# Patient Record
Sex: MALE | Race: WHITE | NOT HISPANIC OR LATINO | Employment: STUDENT | URBAN - METROPOLITAN AREA
[De-identification: names, ages, dates, MRNs, and addresses within clinical notes are randomized per-mention and may not be internally consistent; named-entity substitution may affect disease eponyms.]

---
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
## 2025-01-17 DIAGNOSIS — F41.1 GAD (GENERALIZED ANXIETY DISORDER): ICD-10-CM

## 2025-01-17 RX ORDER — CARIPRAZINE 4.5 MG/1
4.5 CAPSULE, GELATIN COATED ORAL DAILY
Qty: 30 CAPSULE | Refills: 0 | OUTPATIENT
Start: 2025-01-17

## 2025-01-17 NOTE — TELEPHONE ENCOUNTER
Reason for call:   [x] Refill   [] Prior Auth  [] Other:     Office:   [] PCP/Provider -   [x] Specialty/Provider - [facility]     Medication:     cariprazine (Vraylar) 4.5 MG : TAKE 1 CAPSULE (4.5 MG TOTAL) BY MOUTH DAILY,          Pharmacy: [facility]     Does the patient have enough for 3 days?   [] Yes   [x] No - Send as HP to POD

## 2025-01-20 RX ORDER — CARIPRAZINE 4.5 MG/1
4.5 CAPSULE, GELATIN COATED ORAL DAILY
Qty: 30 CAPSULE | Refills: 0 | Status: SHIPPED | OUTPATIENT
Start: 2025-01-20

## 2025-01-25 DIAGNOSIS — F33.1 MAJOR DEPRESSIVE DISORDER, RECURRENT, MODERATE (HCC): ICD-10-CM

## 2025-01-25 DIAGNOSIS — F41.1 GAD (GENERALIZED ANXIETY DISORDER): ICD-10-CM

## 2025-01-27 RX ORDER — ESCITALOPRAM OXALATE 20 MG/1
30 TABLET ORAL DAILY
Qty: 45 TABLET | Refills: 0 | Status: SHIPPED | OUTPATIENT
Start: 2025-01-27 | End: 2025-02-05 | Stop reason: SDUPTHER

## 2025-02-05 ENCOUNTER — OFFICE VISIT (OUTPATIENT)
Dept: PSYCHIATRY | Facility: CLINIC | Age: 20
End: 2025-02-05
Payer: COMMERCIAL

## 2025-02-05 VITALS
HEIGHT: 69 IN | SYSTOLIC BLOOD PRESSURE: 134 MMHG | HEART RATE: 75 BPM | WEIGHT: 216 LBS | DIASTOLIC BLOOD PRESSURE: 71 MMHG | BODY MASS INDEX: 31.99 KG/M2

## 2025-02-05 DIAGNOSIS — F41.1 GAD (GENERALIZED ANXIETY DISORDER): ICD-10-CM

## 2025-02-05 DIAGNOSIS — F33.1 MAJOR DEPRESSIVE DISORDER, RECURRENT, MODERATE (HCC): ICD-10-CM

## 2025-02-05 PROCEDURE — 90833 PSYTX W PT W E/M 30 MIN: CPT | Performed by: NURSE PRACTITIONER

## 2025-02-05 PROCEDURE — 99214 OFFICE O/P EST MOD 30 MIN: CPT | Performed by: NURSE PRACTITIONER

## 2025-02-05 RX ORDER — ESCITALOPRAM OXALATE 20 MG/1
30 TABLET ORAL DAILY
Qty: 45 TABLET | Refills: 3 | Status: SHIPPED | OUTPATIENT
Start: 2025-02-05

## 2025-02-16 NOTE — ASSESSMENT & PLAN NOTE
Lexapro 1.5 tablets 30 mg total by mouth daily, Vraylar 4.5 mg daily      Jose Daniel reports depression has improved.  Denies suicidal ideation.  He is social and has a girlfriend and reports the relationship is good.  Reports appetite and sleep patterns are within normal limits.  Aims negative.  Medication is effective in controlling depression and anxiety.  Takes his medication as prescribed and family are supportive.    Orders:    escitalopram (LEXAPRO) 20 mg tablet; Take 1.5 tablets (30 mg total) by mouth daily

## 2025-02-16 NOTE — PSYCH
Visit Time    Visit Start Time: 5:00  Visit Stop Time: 530  Total Visit Duration:  30 minutes minutes    Subjective:     Patient ID: Jose Daniel Anthony is a 19 y.o. male.  History of anxiety and depression seen for medication management and mood assessment  Assessment & Plan  CHIQUITA (generalized anxiety disorder)  He reports moods are stable, medication are effective and he is doing well in school.  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  Denies anxiety or panic attacks.  Occasionally will have situational anxiety due to work for school.  He is coping and discussed coping skills.    Orders:    cariprazine (Vraylar) 4.5 MG capsule; Take 1 capsule (4.5 mg total) by mouth daily    escitalopram (LEXAPRO) 20 mg tablet; Take 1.5 tablets (30 mg total) by mouth daily    Major depressive disorder, recurrent, moderate (HCC)  Lexapro 1.5 tablets 30 mg total by mouth daily, Vraylar 4.5 mg daily      Jose Daniel reports depression has improved.  Denies suicidal ideation.  He is social and has a girlfriend and reports the relationship is good.  Reports appetite and sleep patterns are within normal limits.  Aims negative.  Medication is effective in controlling depression and anxiety.  Takes his medication as prescribed and family are supportive.    Orders:    escitalopram (LEXAPRO) 20 mg tablet; Take 1.5 tablets (30 mg total) by mouth daily       HPI ROS Appetite Changes and Sleep: normal appetite and normal energy level    Review Of Systems:     Mood Anxiety and Depression minimal   Behavior Normal    Thought Content Normal   General Emotional Problems   Personality Normal   Other Psych Symptoms Normal   Constitutional As Noted in HPI   ENT As Noted in HPI   Cardiovascular As Noted in HPI   Respiratory As Noted in HPI   Gastrointestinal As Noted in HPI   Genitourinary As Noted in HPI   Musculoskeletal As Noted in HPI   Integumentary As Noted in HPI   Neurological As Noted in HPI   Endocrine Normal    Other Symptoms Normal         Laboratory Results:     Substance Abuse History:  Social History     Substance and Sexual Activity   Drug Use Not Currently    Types: Marijuana    Comment: rare for anxiety       Family Psychiatric History:   Family History   Problem Relation Age of Onset    No Known Problems Mother     Bipolar disorder Father        The following portions of the patient's history were reviewed and updated as appropriate: allergies, current medications, past family history, past medical history, past social history, past surgical history, and problem list.    Social History     Socioeconomic History    Marital status: Single     Spouse name: Not on file    Number of children: Not on file    Years of education: Not on file    Highest education level: Not on file   Occupational History    Not on file   Tobacco Use    Smoking status: Some Days     Types: E-Cigarettes    Smokeless tobacco: Never   Vaping Use    Vaping status: Never Used   Substance and Sexual Activity    Alcohol use: Never    Drug use: Not Currently     Types: Marijuana     Comment: rare for anxiety    Sexual activity: Not Currently     Partners: Female   Other Topics Concern    Not on file   Social History Narrative    Not on file     Social Drivers of Health     Financial Resource Strain: Low Risk  (4/19/2021)    Overall Financial Resource Strain (CARDIA)     Difficulty of Paying Living Expenses: Not hard at all   Food Insecurity: No Food Insecurity (4/19/2021)    Hunger Vital Sign     Worried About Running Out of Food in the Last Year: Never true     Ran Out of Food in the Last Year: Never true   Transportation Needs: No Transportation Needs (4/19/2021)    PRAPARE - Transportation     Lack of Transportation (Medical): No     Lack of Transportation (Non-Medical): No   Physical Activity: Sufficiently Active (4/19/2021)    Exercise Vital Sign     Days of Exercise per Week: 5 days     Minutes of Exercise per Session: 120 min   Stress: Stress Concern Present  (4/19/2021)    [employer] [place] [employer] - Occupational Stress Questionnaire     Feeling of Stress : To some extent   Social Connections: Unknown (4/19/2021)    Social Connection and Isolation Panel [NHANES]     Frequency of Communication with Friends and Family: Twice a week     Frequency of Social Gatherings with Friends and Family: Twice a week     Attends [employer] Services: Not on file     Active Member of Clubs or Organizations: Not on file     Attends Club or Organization Meetings: Not on file     Marital Status: Never    Intimate Partner Violence: Not At Risk (4/19/2021)    Humiliation, Afraid, Rape, and Kick questionnaire     Fear of Current or Ex-Partner: No     Emotionally Abused: No     Physically Abused: No     Sexually Abused: No   Housing Stability: Not on file     Social History     Social History Narrative    Not on file       Objective:     Mental status:  Appearance calm and cooperative , adequate hygiene and grooming, and good eye contact    Mood mood appropriate   Affect affect appropriate    Speech a normal rate   Thought Processes normal thought processes   Hallucinations no hallucinations present    Thought Content no delusions   Abnormal Thoughts no suicidal thoughts  and no homicidal thoughts    Orientation  oriented to person and place and time   Remote Memory short term memory intact and long term memory intact   Attention Span concentration intact   Intellect Appears to be of Average Intelligence   Insight Insight intact   Judgement judgment was intact   Muscle Strength Muscle strength and tone were normal   Language no difficulty naming common objects   Fund of Knowledge displays adequate knowledge of current events   Pain none   Pain Scale 0       Assessment/Plan:       Diagnoses and all orders for this visit:    CHIQUITA (generalized anxiety disorder)  -     cariprazine (Vraylar) 4.5 MG capsule; Take 1 capsule (4.5 mg total) by mouth daily  -     escitalopram  "(LEXAPRO) 20 mg tablet; Take 1.5 tablets (30 mg total) by mouth daily    Major depressive disorder, recurrent, moderate (HCC)  -     escitalopram (LEXAPRO) 20 mg tablet; Take 1.5 tablets (30 mg total) by mouth daily          Treatment Recommendations- Risks Benefits      Immediate Medical/Psychiatric/Psychotherapy Treatments and Any Precautions: Continue treatment plan    Risks, Benefits And Possible Side Effects Of Medications:  {PSYCH RISK, BENEFITS AND POSSIBLE SIDE EFFECTS (Optional):63650    Psychotherapy Provided: 30 minutes individual psychotherapy provided.   Supportive therapy  Medication evaluation  Mood assessment  Aims negative  Normalized and validated his feelings  Safety plan updated   Depression Follow-up Plan Completed: Yes  See safety plan  Goals discussed in session: Maintain stable mood    \"Portions of the record may have been created with voice recognition software. Occasional wrong word or \"sound a like\" substitutions may have occurred due to the inherent limitations of voice recognition software. Read the chart carefully and recognize, using context, where substitutions have occurred. Please call if you have any questions. \"                                       "

## 2025-02-16 NOTE — ASSESSMENT & PLAN NOTE
He reports moods are stable, medication are effective and he is doing well in school.  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  Denies anxiety or panic attacks.  Occasionally will have situational anxiety due to work for school.  He is coping and discussed coping skills.    Orders:    cariprazine (Vraylar) 4.5 MG capsule; Take 1 capsule (4.5 mg total) by mouth daily    escitalopram (LEXAPRO) 20 mg tablet; Take 1.5 tablets (30 mg total) by mouth daily

## 2025-02-16 NOTE — BH CRISIS PLAN
Client Name: Jose Daniel Anthony       Client YOB: 2005    [facility] Safety Plan      Creation Date: 2/6/24 Update Date: 2/5/25   Created By: Noemy Boothe, PhD Last Updated By: Noemy Boothe, PhD      Step 1: Warning Signs:   Warning Signs   flustered            Step 2: Internal Coping Strategies:   Internal Coping Strategies   gymn something physical            Step 3: People and social settings that provide distraction:   Name Contact Information   none     Places   gymn           Step 4: People whom I can ask for help during a crisis:      Name Contact Information    mother, father cell      Step 5: Professionals or agencies I can contact during a crisis:      Clinican/Agency Name Phone Emergency Contact    [phone] Dr. Boothe      Local Emergency Department Emergency Department Phone Emergency Department Address    [address]          Crisis Phone Numbers:   Suicide Prevention Lifeline: Call or Text  [address] Crisis Text Line: Text HOME to 940-800   Please note: Some [facility] do not have a separate number for Child/Adolescent specific crisis. If your county is not listed under Child/Adolescent, please call the adult number for your county      Adult Crisis Numbers: Child/Adolescent Crisis Numbers   [facility]: [phone] [facility]: [phone]   [facility]: [phone] [facility]: [phone]   [facility]: [phone] [place]: [phone]   [facility]: [phone] [facility]: [phone]   [facility]: [phone]   [facility]: [phone]   [facility]: [phone]   [facility]: [phone] (daytime) [phone] (after hours, weekends, holidays)      Step 6: Making the environment safer (plan for lethal means safety):   Patient did not identify any lethal methods: Yes     Optional: What is most important to me and worth living for?   family     [facility] Safety Plan. Sarika Hurtado and  Genaro Ruiz. Used with permission of the authors.

## 2025-04-02 ENCOUNTER — TELEPHONE (OUTPATIENT)
Age: 20
End: 2025-04-02

## 2025-04-03 ENCOUNTER — OFFICE VISIT (OUTPATIENT)
Dept: PSYCHIATRY | Facility: CLINIC | Age: 20
End: 2025-04-03
Payer: COMMERCIAL

## 2025-04-03 VITALS
HEIGHT: 69 IN | WEIGHT: 216.8 LBS | BODY MASS INDEX: 32.11 KG/M2 | DIASTOLIC BLOOD PRESSURE: 70 MMHG | SYSTOLIC BLOOD PRESSURE: 133 MMHG | HEART RATE: 71 BPM

## 2025-04-03 DIAGNOSIS — F33.1 MAJOR DEPRESSIVE DISORDER, RECURRENT, MODERATE (HCC): ICD-10-CM

## 2025-04-03 DIAGNOSIS — F43.10 PTSD (POST-TRAUMATIC STRESS DISORDER): Primary | ICD-10-CM

## 2025-04-03 DIAGNOSIS — Z79.899 LONG-TERM USE OF HIGH-RISK MEDICATION: ICD-10-CM

## 2025-04-03 DIAGNOSIS — F41.1 GAD (GENERALIZED ANXIETY DISORDER): ICD-10-CM

## 2025-04-03 PROCEDURE — 99214 OFFICE O/P EST MOD 30 MIN: CPT | Performed by: NURSE PRACTITIONER

## 2025-04-03 PROCEDURE — 90833 PSYTX W PT W E/M 30 MIN: CPT | Performed by: NURSE PRACTITIONER

## 2025-04-03 NOTE — ASSESSMENT & PLAN NOTE
Reports PTSD symptoms are less, father stopped drinking 3 years ago and he reports participating in activities with him such as fishing.  He does occasionally recall past traumas and reports coping improved.  Lexapro 20 mg 1.5 tablets daily to equal 30 mg is effective for depression and anxiety.  He denies sexual side effects, appetite is good and he is sleeping PHQ-9 score of 7 indicates mild depression without suicidal ideations.  He has a girlfriend who is supportive

## 2025-04-03 NOTE — ASSESSMENT & PLAN NOTE
CHIQUITA-7 score of 9 indicates mild anxiety.  Situational anxiety is expressed as school tasks or job duties.  He reports coping well.  Denies side effects of medication.  Aims negative

## 2025-04-03 NOTE — ASSESSMENT & PLAN NOTE
Mild depression is reported usually due to situations and stress.  Support was provided discussed improvement throughout the years.  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  Girlfriend is supportive

## 2025-04-03 NOTE — BH TREATMENT PLAN
[facility]     Name and Date of Birth:  Jose Daniel Anthony 19 y.o. 2005  Date of Treatment Plan: April 15, 2021, November 4, 2021, April 19, 2022, November 15, 2022, May 1, 2023, November 2, 2023 April 29, 2024 October 31, 2024, April 3, 2025  Diagnosis/Diagnoses:    1. CHIQUITA (generalized anxiety disorder)    2. PTSD (post-traumatic stress disorder)    3. Major depressive disorder, recurrent, moderate (HCC)       Strengths/Personal Resources for Self-Care: supportive family, taking medications as prescribed, ability to communicate needs, average or above intelligence.  Area/Areas of need (in own words): anxiety, depression  1.         Long Term Goal: decrease anxiety and depression  Target Date:6 months -10/3/2025  Person/Persons responsible for completion of goal: Jose Daniel, provider, therapist, family  2.         Short Term Objective (s) - How will we reach this goal?:   A. Provider new recommended medication/dosage changes and/or continue medication(s): continue current medications as prescribed.  B. therapy.  C. structure, adequate sleep.  Target Date:6 months -10/3/2025     Person/Persons Responsible for Completion of Goal: Jose Daniel, provider, therapist, family  Progress Towards Goals: initiating treatment  Treatment Modality: medication management every 1-3 months  Review due 180 days from date of this plan: 6 months -10/3/2025  Expected length of service: maintenance  My Physician/PA/NP and I have developed this plan together and I agree to work on the goals and objectives. I understand the treatment goals that were developed for my treat
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
## 2025-04-09 NOTE — BH CRISIS PLAN
Client Name: Jose Daniel Anthony       Client YOB: 2005    [facility] Safety Plan      Creation Date: 2/6/24 Update Date: 2/5/25   Created By: Noemy Boothe, PhD Last Updated By: Noemy Boothe, PhD      Step 1: Warning Signs:   Warning Signs   flustered            Step 2: Internal Coping Strategies:   Internal Coping Strategies   gymn something physical            Step 3: People and social settings that provide distraction:   Name Contact Information   none     Places   gymn           Step 4: People whom I can ask for help during a crisis:      Name Contact Information    mother, father cell      Step 5: Professionals or agencies I can contact during a crisis:      Clinican/Agency Name Phone Emergency Contact    [phone] Dr. Boothe      Local Emergency Department Emergency Department Phone Emergency Department Address    [id number]          Crisis Phone Numbers:   Suicide Prevention Lifeline: Call or Text  [address] Crisis Text Line: Text HOME to 857-504   Please note: Some [facility] do not have a separate number for Child/Adolescent specific crisis. If your county is not listed under Child/Adolescent, please call the adult number for your county      Adult Crisis Numbers: Child/Adolescent Crisis Numbers   [facility]: [phone] [facility]: [phone]   [facility]: [phone] [facility]: [phone]   [facility]: [phone] [place]: [phone]   [facility]: [phone] [place]: [phone]   [facility]: [phone]   [facility]: [phone]   [facility]: [phone]   [facility]: [phone] (daytime) [phone] (after hours, weekends, holidays)      Step 6: Making the environment safer (plan for lethal means safety):   Patient did not identify any lethal methods: Yes     Optional: What is most important to me and worth living for?   family     [place] Safety Plan. Sarika Hurtado and  Genaro Ruiz. Used with permission of the authors.

## 2025-04-28 ENCOUNTER — TELEPHONE (OUTPATIENT)
Age: 20
End: 2025-04-28

## 2025-04-28 NOTE — TELEPHONE ENCOUNTER
Mother of patient called in asking if the patient stopped in the office if they could get a copy of the blood work order their psychiatrist needs them to get.    Writer stated the office staff would be able to assist if the patient went into the office.

## 2025-06-11 ENCOUNTER — TELEPHONE (OUTPATIENT)
Age: 20
End: 2025-06-11

## 2025-06-11 ENCOUNTER — OFFICE VISIT (OUTPATIENT)
Dept: PSYCHIATRY | Facility: CLINIC | Age: 20
End: 2025-06-11
Payer: COMMERCIAL

## 2025-06-11 VITALS
HEART RATE: 66 BPM | DIASTOLIC BLOOD PRESSURE: 65 MMHG | BODY MASS INDEX: 32.44 KG/M2 | SYSTOLIC BLOOD PRESSURE: 131 MMHG | WEIGHT: 219 LBS | HEIGHT: 69 IN

## 2025-06-11 DIAGNOSIS — F41.1 GAD (GENERALIZED ANXIETY DISORDER): ICD-10-CM

## 2025-06-11 DIAGNOSIS — F33.1 MAJOR DEPRESSIVE DISORDER, RECURRENT, MODERATE (HCC): ICD-10-CM

## 2025-06-11 PROCEDURE — 99214 OFFICE O/P EST MOD 30 MIN: CPT | Performed by: NURSE PRACTITIONER

## 2025-06-11 PROCEDURE — 90833 PSYTX W PT W E/M 30 MIN: CPT | Performed by: NURSE PRACTITIONER

## 2025-06-11 NOTE — TELEPHONE ENCOUNTER
Medication Refill Request     Name of Medication Lexapro  Dose/Frequency 20mg 1.5 tab daily  Quantity 45  Verified pharmacy   [x]  Verified ordering Provider   [x]  Does patient have enough for the next 3 days? Yes [x] No []  Does patient have a follow-up appointment scheduled? Yes [x] No []   If so when is appointment: Patient was seen today      Medication Refill Request     Name of Medication Vraylar  Dose/Frequency 4.5mg   Quantity 30  Verified pharmacy   [x]  Verified ordering Provider   [x]  Does patient have enough for the next 3 days? Yes [x] No []  Does patient have a follow-up appointment scheduled? Yes [x] No []   If so when is appointment: Pt seen today

## 2025-06-11 NOTE — TELEPHONE ENCOUNTER
Patient's parent/guardian contacted the office to schedule a follow up visit with provider. Patient is now scheduled for 6/11/2025  at 12 pm in office with Dr Boothe.

## 2025-06-12 RX ORDER — ESCITALOPRAM OXALATE 20 MG/1
30 TABLET ORAL DAILY
Qty: 45 TABLET | Refills: 2 | Status: SHIPPED | OUTPATIENT
Start: 2025-06-12

## 2025-07-15 RX ORDER — ESCITALOPRAM OXALATE 20 MG/1
20 TABLET ORAL DAILY
Qty: 30 TABLET | Refills: 2 | Status: SHIPPED | OUTPATIENT
Start: 2025-07-15 | End: 2025-07-21

## 2025-07-15 RX ORDER — SODIUM FLUORIDE 5 MG/ML
PASTE, DENTIFRICE DENTAL
COMMUNITY
Start: 2025-04-14

## 2025-07-21 ENCOUNTER — OFFICE VISIT (OUTPATIENT)
Dept: PSYCHIATRY | Facility: CLINIC | Age: 20
End: 2025-07-21
Payer: COMMERCIAL

## 2025-07-21 VITALS
HEIGHT: 69 IN | DIASTOLIC BLOOD PRESSURE: 82 MMHG | HEART RATE: 66 BPM | SYSTOLIC BLOOD PRESSURE: 135 MMHG | WEIGHT: 212.4 LBS | BODY MASS INDEX: 31.46 KG/M2

## 2025-07-21 DIAGNOSIS — F41.1 GAD (GENERALIZED ANXIETY DISORDER): ICD-10-CM

## 2025-07-21 DIAGNOSIS — F33.1 MODERATE EPISODE OF RECURRENT MAJOR DEPRESSIVE DISORDER (HCC): Primary | ICD-10-CM

## 2025-07-21 DIAGNOSIS — F33.1 MAJOR DEPRESSIVE DISORDER, RECURRENT, MODERATE (HCC): ICD-10-CM

## 2025-07-21 PROCEDURE — 99214 OFFICE O/P EST MOD 30 MIN: CPT | Performed by: NURSE PRACTITIONER

## 2025-07-21 PROCEDURE — 90833 PSYTX W PT W E/M 30 MIN: CPT | Performed by: NURSE PRACTITIONER

## 2025-07-21 RX ORDER — DULOXETIN HYDROCHLORIDE 20 MG/1
20 CAPSULE, DELAYED RELEASE ORAL DAILY
Qty: 60 CAPSULE | Refills: 3 | Status: SHIPPED | OUTPATIENT
Start: 2025-07-21 | End: 2025-07-21 | Stop reason: SDUPTHER

## 2025-07-21 RX ORDER — DULOXETIN HYDROCHLORIDE 20 MG/1
20 CAPSULE, DELAYED RELEASE ORAL 2 TIMES DAILY
Qty: 60 CAPSULE | Refills: 3 | Status: SHIPPED | OUTPATIENT
Start: 2025-07-21
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
## 2025-07-21 NOTE — ASSESSMENT & PLAN NOTE
Lexapro 20 mg changed to Cymbalta 20 mg BID  Jose Daniel reports breaking up with girlfriend and is angry, anxious and has panic attacks. Lexapro is not effective for control of symptoms. Instructed how to wean off Lexapro and start Cymbalta 20 mg BID. Discussed genetic testing and mother will let us know if deductible is reasonable. CHIQUITA-7 score of 19 indicates severe anxiety. Offered prn hydroxyzine and he declined. Discussed coping skills, working out at the gym and seeing friends. Appetite is poor and stressed importance of hydration and protein, vitamins. He agreed. Will talk to therapist.    Spironolactone Pregnancy And Lactation Text: This medication can cause feminization of the male fetus and should be avoided during pregnancy. The active metabolite is also found in breast milk.